# Patient Record
Sex: MALE | Race: WHITE | NOT HISPANIC OR LATINO | Employment: OTHER | URBAN - NONMETROPOLITAN AREA
[De-identification: names, ages, dates, MRNs, and addresses within clinical notes are randomized per-mention and may not be internally consistent; named-entity substitution may affect disease eponyms.]

---

## 2021-05-24 ENCOUNTER — IMMUNIZATIONS (OUTPATIENT)
Dept: FAMILY MEDICINE CLINIC | Facility: HOSPITAL | Age: 61
End: 2021-05-24

## 2021-05-24 DIAGNOSIS — Z23 ENCOUNTER FOR IMMUNIZATION: Primary | ICD-10-CM

## 2021-05-24 PROCEDURE — 91300 SARS-COV-2 / COVID-19 MRNA VACCINE (PFIZER-BIONTECH) 30 MCG: CPT

## 2021-05-24 PROCEDURE — 0001A SARS-COV-2 / COVID-19 MRNA VACCINE (PFIZER-BIONTECH) 30 MCG: CPT

## 2021-06-16 ENCOUNTER — IMMUNIZATIONS (OUTPATIENT)
Dept: FAMILY MEDICINE CLINIC | Facility: HOSPITAL | Age: 61
End: 2021-06-16

## 2021-06-16 DIAGNOSIS — Z23 ENCOUNTER FOR IMMUNIZATION: Primary | ICD-10-CM

## 2021-06-16 PROCEDURE — 0002A SARS-COV-2 / COVID-19 MRNA VACCINE (PFIZER-BIONTECH) 30 MCG: CPT

## 2021-06-16 PROCEDURE — 91300 SARS-COV-2 / COVID-19 MRNA VACCINE (PFIZER-BIONTECH) 30 MCG: CPT

## 2021-07-07 ENCOUNTER — HOSPITAL ENCOUNTER (EMERGENCY)
Facility: HOSPITAL | Age: 61
Discharge: HOME | End: 2021-07-07
Attending: STUDENT IN AN ORGANIZED HEALTH CARE EDUCATION/TRAINING PROGRAM
Payer: COMMERCIAL

## 2021-07-07 VITALS
RESPIRATION RATE: 18 BRPM | DIASTOLIC BLOOD PRESSURE: 63 MMHG | SYSTOLIC BLOOD PRESSURE: 131 MMHG | HEIGHT: 67 IN | BODY MASS INDEX: 41.59 KG/M2 | TEMPERATURE: 97.6 F | WEIGHT: 264.99 LBS | HEART RATE: 55 BPM | OXYGEN SATURATION: 98 %

## 2021-07-07 DIAGNOSIS — S89.90XA INJURY OF CALF: ICD-10-CM

## 2021-07-07 DIAGNOSIS — M79.89 SWELLING OF CALF: Primary | ICD-10-CM

## 2021-07-07 PROCEDURE — 99284 EMERGENCY DEPT VISIT MOD MDM: CPT | Performed by: PHYSICIAN ASSISTANT

## 2021-07-07 PROCEDURE — 99283 EMERGENCY DEPT VISIT LOW MDM: CPT

## 2021-07-07 PROCEDURE — 96372 THER/PROPH/DIAG INJ SC/IM: CPT

## 2021-07-07 RX ORDER — LOSARTAN POTASSIUM 50 MG/1
TABLET ORAL
COMMUNITY

## 2021-07-07 RX ORDER — FLUOXETINE HYDROCHLORIDE 40 MG/1
CAPSULE ORAL
COMMUNITY
Start: 2021-06-03

## 2021-07-07 RX ORDER — OMEPRAZOLE 20 MG/1
CAPSULE, DELAYED RELEASE ORAL
COMMUNITY

## 2021-07-07 RX ORDER — SPIRONOLACTONE 25 MG/1
25 TABLET ORAL DAILY
COMMUNITY
Start: 2021-05-29

## 2021-07-07 RX ORDER — AMLODIPINE BESYLATE 10 MG/1
TABLET ORAL
COMMUNITY
Start: 2021-06-10

## 2021-07-07 RX ORDER — PRASUGREL 10 MG/1
TABLET, FILM COATED ORAL
COMMUNITY

## 2021-07-07 RX ORDER — LEVOTHYROXINE SODIUM 0.05 MG/1
TABLET ORAL
COMMUNITY
Start: 2021-06-03

## 2021-07-07 RX ORDER — BUPROPION HYDROCHLORIDE 150 MG/1
TABLET, EXTENDED RELEASE ORAL
COMMUNITY
Start: 2021-05-19

## 2021-07-07 RX ORDER — METOPROLOL TARTRATE 50 MG/1
50 TABLET, FILM COATED ORAL EVERY 12 HOURS
COMMUNITY
Start: 2021-05-20

## 2021-07-07 RX ORDER — EMPAGLIFLOZIN 25 MG/1
TABLET, FILM COATED ORAL
COMMUNITY

## 2021-07-07 RX ORDER — IBUPROFEN 200 MG
600 TABLET ORAL
COMMUNITY

## 2021-07-07 RX ORDER — ATORVASTATIN CALCIUM 40 MG/1
TABLET, FILM COATED ORAL
COMMUNITY
Start: 2021-06-11

## 2021-07-07 RX ORDER — ASPIRIN 81 MG/1
TABLET ORAL
COMMUNITY

## 2021-07-07 RX ORDER — LOSARTAN POTASSIUM 100 MG/1
100 TABLET ORAL DAILY
COMMUNITY
Start: 2021-05-29

## 2021-07-07 RX ADMIN — ENOXAPARIN SODIUM 120 MG: 120 INJECTION SUBCUTANEOUS at 16:05

## 2021-07-07 NOTE — ED PROVIDER NOTES
History  Chief Complaint   Patient presents with    Leg Pain     pt c/o left calf pain after standing on incline and turning extremity feeling a tear and heard a snap with immediate swelling and pain for past 4 days  pt seen pcp yesterday and instructed to come to ED per further evaulation  hx dvt in opposite leg  denies travel/sob/fevers/cough     51-year-old male presents the emergency department for evaluation of left calf pain/swelling  Patient states 4 days ago he was standing on an incline states he went to turn and he is not sure if his shoes caught on the ground but he felt a tear in his left calf  Patient states he has been treating pain as needed at home  Reports pain resolves with rest but worsens with bearing weight  Denies any instability in the leg  Denies any pain in the knee  Reports calf has been swollen and tender  Reports bruising  Patient reports history of DVT in right leg after a surgery  No longer any anticoagulation  Patient was seen by PCP yesterday instructed to come to the emergency department for further evaluation  Patient denies any weakness, numbness, paresthesias in the leg  Patient denies any chest pain, palpitations, shortness of breath, hemoptysis  Denies any recent surgeries or bed rest       History provided by:  Patient  Leg Pain  Location:  Leg  Leg location:  L lower leg  Pain details:     Quality:  Unable to specify    Radiates to:  Does not radiate    Severity:  Mild    Onset quality:  Sudden    Duration:  4 days    Progression:  Unchanged  Chronicity:  New  Dislocation: no    Relieved by:  Rest  Worsened by:  Bearing weight  Associated symptoms: swelling    Associated symptoms: no back pain, no decreased ROM, no fatigue, no fever, no itching, no muscle weakness, no neck pain, no numbness, no stiffness and no tingling        Prior to Admission Medications   Prescriptions Last Dose Informant Patient Reported? Taking?    Cholecalciferol 125 MCG (5000 UT) capsule Yes No   Sig: Take by mouth   Empagliflozin (Jardiance) 25 MG TABS   Yes No   Sig: Take by mouth   FLUoxetine (PROzac) 40 MG capsule   Yes No   amLODIPine (NORVASC) 10 mg tablet   Yes No   aspirin (Aspir-Low) 81 mg EC tablet   Yes No   Sig: Take by mouth   atorvastatin (LIPITOR) 40 mg tablet   Yes No   buPROPion (ZYBAN) 150 MG 12 hr tablet   Yes No   ibuprofen (MOTRIN) 200 mg tablet   Yes No   Sig: Take 600 mg by mouth   insulin aspart (NovoLOG) 100 units/mL injection   Yes No   levothyroxine 50 mcg tablet   Yes No   losartan (COZAAR) 100 MG tablet   Yes No   Sig: Take 100 mg by mouth daily   losartan (COZAAR) 50 mg tablet   Yes No   metFORMIN (GLUCOPHAGE) 1000 MG tablet   Yes No   metoprolol tartrate (LOPRESSOR) 50 mg tablet   Yes No   Sig: Take 50 mg by mouth every 12 (twelve) hours   omeprazole (PriLOSEC) 20 mg delayed release capsule   Yes No   prasugrel (EFFIENT) tablet   Yes No   Sig: Take by mouth   spironolactone (ALDACTONE) 25 mg tablet   Yes No   Sig: Take 25 mg by mouth daily      Facility-Administered Medications: None       History reviewed  No pertinent past medical history  History reviewed  No pertinent surgical history  History reviewed  No pertinent family history  I have reviewed and agree with the history as documented  E-Cigarette/Vaping    E-Cigarette Use Never User      E-Cigarette/Vaping Substances     Social History     Tobacco Use    Smoking status: Never Smoker    Smokeless tobacco: Never Used   Vaping Use    Vaping Use: Never used   Substance Use Topics    Alcohol use: Not Currently    Drug use: Never       Review of Systems   Constitutional: Negative  Negative for fatigue and fever  HENT: Negative  Respiratory: Negative  Cardiovascular: Negative  Gastrointestinal: Negative  Musculoskeletal: Negative for back pain, neck pain and stiffness  Calf swelling left   Skin: Positive for color change  Negative for itching  Neurological: Negative      All other systems reviewed and are negative  Physical Exam  Physical Exam  Vitals and nursing note reviewed  Constitutional:       General: He is not in acute distress  Appearance: Normal appearance  He is obese  He is not ill-appearing, toxic-appearing or diaphoretic  HENT:      Head: Normocephalic and atraumatic  Nose: Nose normal       Mouth/Throat:      Mouth: Mucous membranes are moist       Pharynx: Oropharynx is clear  No oropharyngeal exudate or posterior oropharyngeal erythema  Eyes:      Extraocular Movements: Extraocular movements intact  Conjunctiva/sclera: Conjunctivae normal       Pupils: Pupils are equal, round, and reactive to light  Cardiovascular:      Rate and Rhythm: Normal rate and regular rhythm  Pulses:           Dorsalis pedis pulses are 2+ on the left side  Posterior tibial pulses are 2+ on the left side  Pulmonary:      Effort: Pulmonary effort is normal  No respiratory distress  Breath sounds: Normal breath sounds  No stridor  No wheezing, rhonchi or rales  Chest:      Chest wall: No tenderness  Musculoskeletal:         General: Swelling ( left calf) and tenderness ( left calf) present  Normal range of motion  Cervical back: Normal range of motion  Comments: Patient with swelling of the left calf  He is able to flex and extend his ankle  There is some bruising over the shin  No pallor  Patient with good pulses and normal sensation  Patient with no pain when lying   Skin:     General: Skin is warm and dry  Capillary Refill: Capillary refill takes less than 2 seconds  Coloration: Skin is not jaundiced or pale  Findings: Bruising (Left shin -yellow in nature appears to be healing) present  No erythema  Comments: No wounds   Neurological:      General: No focal deficit present  Mental Status: He is alert and oriented to person, place, and time  Cranial Nerves: No cranial nerve deficit        Sensory: No sensory deficit  Motor: No weakness  Coordination: Coordination normal       Gait: Gait normal       Deep Tendon Reflexes: Reflexes normal    Psychiatric:         Mood and Affect: Mood normal          Behavior: Behavior normal          Thought Content: Thought content normal          Judgment: Judgment normal          Vital Signs  ED Triage Vitals [07/07/21 1518]   Temperature Pulse Respirations Blood Pressure SpO2   97 6 °F (36 4 °C) 57 17 141/65 98 %      Temp Source Heart Rate Source Patient Position - Orthostatic VS BP Location FiO2 (%)   Temporal Monitor Lying Right arm --      Pain Score       6           Vitals:    07/07/21 1518 07/07/21 1530 07/07/21 1545 07/07/21 1600   BP: 141/65 119/59 137/71 131/63   Pulse: 57 56 56 55   Patient Position - Orthostatic VS: Lying  Lying Lying         Visual Acuity      ED Medications  Medications   enoxaparin (LOVENOX) subcutaneous injection 120 mg (120 mg Subcutaneous Given 7/7/21 1605)       Diagnostic Studies  Results Reviewed     None                 VAS lower limb venous duplex study, unilateral/limited    (Results Pending)              Procedures  Procedures         ED Course             MDM  Number of Diagnoses or Management Options  Injury of calf: new and requires workup  Swelling of calf: new and requires workup  Diagnosis management comments: 64year old male presents to the emergency department for evaluation of left calf swelling and pain status post twisting injury 4 days ago  Vitals and medical record reviewed  On exam calf is swollen with bruising over the shin which appears to be healing  He has no tenderness when laying and refused any analgesia in the ED  Patient is able to flex and extend the ankle  He has normal pulses and good sensation  Patient has no pallor  he denies any paresthesias  Compartment syndrome unlikely  Patient has no bony tenderness on exam   Normal range of motion of the knee  Unable to get ultrasound at this time  Patient was given dose of subcu Lovenox and will return tomorrow for duplex  We will distally follow-up with orthopedics  Strict return precautions were discussed and the patient verbalized understanding  He remained well emergency department and was discharged home       Amount and/or Complexity of Data Reviewed  Review and summarize past medical records: yes        Disposition  Final diagnoses:   Swelling of calf   Injury of calf     Time reflects when diagnosis was documented in both MDM as applicable and the Disposition within this note     Time User Action Codes Description Comment    7/7/2021  3:50 PM Wilburt Rice Add [M79 89] Swelling of calf     7/7/2021  3:52 PM Wilburt Rice Add [S89 90XA] Injury of calf       ED Disposition     ED Disposition Condition Date/Time Comment    Discharge Stable Wed Jul 7, 2021  3:50 PM Marino Lowe discharge to home/self care              Follow-up Information     Follow up With Specialties Details Why [address], DO Family Medicine   [address] [address] [phone]      Juan Shock, [address]  [facility]   [address] [facility]  [address]  [phone]            Discharge Medication List as of 7/7/2021  4:06 PM      CONTINUE these medications which have NOT CHANGED    Details   amLODIPine (NORVASC) 10 mg tablet Starting Thu 6/10/2021, Historical Med      aspirin (Aspir-Low) 81 mg EC tablet Take by mouth, Historical Med      atorvastatin (LIPITOR) 40 mg tablet Starting Fri 6/11/2021, Historical Med      buPROPion (ZYBAN) 150 MG 12 hr tablet Starting Wed 5/19/2021, Historical Med      Cholecalciferol 125 MCG (5000 UT) capsule Take by mouth, Historical Med      Empagliflozin (Jardiance) 25 MG TABS Take by mouth, Historical Med      FLUoxetine (PROzac) 40 MG capsule Starting Thu 6/3/2021, Historical Med      ibuprofen (MOTRIN) 200 mg tablet Take 600 mg by mouth, Historical Med      insulin aspart (NovoLOG) 100 units/mL injection Historical Med      levothyroxine 50 mcg tablet Starting Thu 6/3/2021, Historical Med      !! losartan (COZAAR) 100 MG tablet Take 100 mg by mouth daily, Starting Sat 5/29/2021, Historical Med      !! losartan (COZAAR) 50 mg tablet Historical Med      metFORMIN (GLUCOPHAGE) 1000 MG tablet Starting Wed 6/16/2021, Historical Med      metoprolol tartrate (LOPRESSOR) 50 mg tablet Take 50 mg by mouth every 12 (twelve) hours, Starting Thu 5/20/2021, Historical Med      omeprazole (PriLOSEC) 20 mg delayed release capsule Historical Med      prasugrel (EFFIENT) tablet Take by mouth, Historical Med      spironolactone (ALDACTONE) 25 mg tablet Take 25 mg by mouth daily, Starting Sat 5/29/2021, Historical Med       !! - Potential duplicate medications found  Please discuss with provider  Outpatient Discharge Orders   Ambulatory referral to Orthopedic Surgery   Standing Status: Future Standing Exp  Date: 07/07/22      VAS lower limb venous duplex study, unilateral/limited   Standing Status: Future Standing Exp   Date: 07/07/25       PDMP Review     None          ED Provider  Electronically Signed by           Frandy Sandoval PA-C  07/07/21 3433

## 2021-07-07 NOTE — DISCHARGE INSTRUCTIONS
You were given does a blood thinner in the emergency department today  Is important that you return tomorrow for an ultrasound of her leg to rule out a blood clot  Someone should give you a call to make this appointment  Additionally I would like you to follow up with an orthopedic doctor, referral has been placed for you  If you have any worsening symptoms do hesitate to return to the emergency department

## 2021-07-08 ENCOUNTER — HOSPITAL ENCOUNTER (OUTPATIENT)
Dept: NON INVASIVE DIAGNOSTICS | Facility: HOSPITAL | Age: 61
Discharge: HOME | End: 2021-07-08
Payer: COMMERCIAL

## 2021-07-08 DIAGNOSIS — S89.90XA INJURY OF CALF: ICD-10-CM

## 2021-07-08 DIAGNOSIS — M79.89 SWELLING OF CALF: ICD-10-CM

## 2021-07-08 PROCEDURE — 93971 EXTREMITY STUDY: CPT | Performed by: SURGERY

## 2021-07-08 PROCEDURE — 93971 EXTREMITY STUDY: CPT

## 2021-07-09 VITALS
WEIGHT: 258 LBS | TEMPERATURE: 98.2 F | BODY MASS INDEX: 40.49 KG/M2 | HEART RATE: 60 BPM | HEIGHT: 67 IN | DIASTOLIC BLOOD PRESSURE: 92 MMHG | SYSTOLIC BLOOD PRESSURE: 140 MMHG

## 2021-07-09 DIAGNOSIS — S86.812A STRAIN OF CALF MUSCLE, LEFT, INITIAL ENCOUNTER: Primary | ICD-10-CM

## 2021-07-09 DIAGNOSIS — M79.89 SWELLING OF CALF: ICD-10-CM

## 2021-07-09 PROCEDURE — 3008F BODY MASS INDEX DOCD: CPT | Performed by: ORTHOPAEDIC SURGERY

## 2021-07-09 PROCEDURE — 99203 OFFICE O/P NEW LOW 30 MIN: CPT | Performed by: ORTHOPAEDIC SURGERY

## 2021-07-09 PROCEDURE — 1036F TOBACCO NON-USER: CPT | Performed by: ORTHOPAEDIC SURGERY

## 2021-07-09 NOTE — PROGRESS NOTES
ASSESSMENT/PLAN:    Diagnoses and all orders for this visit:    Strain of calf muscle, left, initial encounter    Swelling of calf  -     Ambulatory referral to Orthopedic Surgery          The patient was informed that he likely sustained a strain of his calf  He was encouraged to continue resting and elevating the left lower extremity  He may try ice to help minimize swelling  He was encouraged to try moist heat or a heating pad if he feels   Crampy pain in his calf  He will continue the Tylenol as needed for pain control  We will see him back in 10 days  He was encouraged to contact the office should questions or concerns arise  I did discuss the potential benefits of physical therapy but he would prefer to avoid therapy if possible  Will assess his progress over the next 10 days and determine if therapy would be reasonable  Return in about 10 days (around 7/19/2021)  _____________________________________________________  CHIEF COMPLAINT:  Chief Complaint   Patient presents with    Left Lower Leg - Pain, Numbness, Swelling         SUBJECTIVE:  Leona Yan is a 64 y o  male who presents  For evaluation of left calf swelling and pain  The patient reports he was watching fireworks on July 4th where he was standing on an incline  At the end of the display, he pivoted to leave and felt a pop in the left calf  He had immediate pain and swelling  He presented to the ED on 07/07 continued pain and swelling at the urging of his PCP  Doppler  Ultrasound was performed to rule out DVT as he has history of DVT in the right lower extremity  This was negative and he was referred to Orthopedics for definitive evaluation and treatment  He denies any prior injuries or trauma in the left lower extremity  He reports pain well localized to the muscle belly of the calf that worsens with any ambulation    He does note that he did have bruising on the calf extending distally but does admit that most of it has resolved  He has been taking Tylenol for the pain without much benefit  He is unable to take NSAIDs due to history of cardiac disease  He denies numbness or tingling  He denies shortness of breath or chest pain  PAST MEDICAL HISTORY:  Past Medical History:   Diagnosis Date    Diabetes mellitus ([facility] [address] )     Heart attack ([facility] [address] ) 1999    Heart disease     Hypertension     Hypothyroidism     Osteoarthritis        PAST SURGICAL HISTORY:  Past Surgical History:   Procedure Laterality Date    CARPAL TUNNEL RELEASE Bilateral     x2    CORONARY STENT PLACEMENT      IVC FILTER INSERTION      QUADRICEPS TENDON REPAIR Right 2001    ROTATOR CUFF REPAIR Bilateral        FAMILY HISTORY:  History reviewed  No pertinent family history      SOCIAL HISTORY:  Social History     Tobacco Use    Smoking status: Never Smoker    Smokeless tobacco: Never Used   Vaping Use    Vaping Use: Never used   Substance Use Topics    Alcohol use: Yes     Comment: rare    Drug use: Never       MEDICATIONS:    Current Outpatient Medications:     amLODIPine (NORVASC) 10 mg tablet, , Disp: , Rfl:     aspirin (Aspir-Low) 81 mg EC tablet, Take by mouth, Disp: , Rfl:     atorvastatin (LIPITOR) 40 mg tablet, , Disp: , Rfl:     buPROPion (ZYBAN) 150 MG 12 hr tablet, , Disp: , Rfl:     Cholecalciferol 125 MCG (5000 UT) capsule, Take by mouth, Disp: , Rfl:     Empagliflozin (Jardiance) 25 MG TABS, Take by mouth, Disp: , Rfl:     FLUoxetine (PROzac) 40 MG capsule, , Disp: , Rfl:     ibuprofen (MOTRIN) 200 mg tablet, Take 600 mg by mouth, Disp: , Rfl:     insulin aspart (NovoLOG) 100 units/mL injection, , Disp: , Rfl:     levothyroxine 50 mcg tablet, , Disp: , Rfl:     losartan (COZAAR) 100 MG tablet, Take 100 mg by mouth daily, Disp: , Rfl:     metFORMIN (GLUCOPHAGE) 1000 MG tablet, , Disp: , Rfl:     metoprolol tartrate (LOPRESSOR) 50 mg tablet, Take 50 mg by mouth every 12 (twelve) hours, Disp: , Rfl:     omeprazole (PriLOSEC) 20 mg delayed release capsule, , Disp: , Rfl:     spironolactone (ALDACTONE) 25 mg tablet, Take 25 mg by mouth daily, Disp: , Rfl:     losartan (COZAAR) 50 mg tablet, , Disp: , Rfl:     prasugrel (EFFIENT) tablet, Take by mouth, Disp: , Rfl:     ALLERGIES:  No Known Allergies    Review of systems:   Constitutional: Negative for fatigue, fever or loss of apetite  HENT: Negative  Respiratory: Negative for shortness of breath, dyspnea  Cardiovascular: Negative for chest pain/tightness  Gastrointestinal: Negative for abdominal pain, N/V  Endocrine: Negative for cold/heat intolerance, unexplained weight loss/gain  Genitourinary: Negative for flank pain, dysuria, hematuria  Musculoskeletal:   Positive as stated in the HPI  Skin: Negative for rash  Neurological:   Negative for numbness and tingling  Psychiatric/Behavioral: Negative for agitation  _____________________________________________________  PHYSICAL EXAMINATION:    Blood pressure 140/92, pulse 60, temperature 98 2 °F (36 8 °C), height 5' 7" (1 702 m), weight 117 kg (258 lb)  General: well developed and well nourished, alert, oriented times 3 and appears comfortable  Psychiatric: Normal  HEENT: Benign  Cardiovascular: Regular    Pulmonary: No wheezing or stridor  Abdomen: Soft, Nontender  Skin: No masses, erythema, lacerations, fluctation, ulcerations  Neurovascular: Motor and sensory exams are grossly intact, distal pulses are palpable  Limb is warm and well perfused good color and capillary refill the toes  MUSCULOSKELETAL EXAMINATION:      The left lower extremity exam demonstrates skin intact, no erythema  There is resolving ecchymosis in the distal lower leg and ankle  He has tenderness to palpation over the muscle belly of the calf  No tenderness over osseous structures  He is able to actively plantar flex and dorsiflex the ankle but does complain of pain  Achilles is full and palpable   The patient has full flexion of the left knee but does complain of pain in the calf with end range extension of the knee  The remainder of the left lower extremity exam is benign          Helio Mendez

## 2021-11-30 ENCOUNTER — OFFICE VISIT (OUTPATIENT)
Dept: URGENT CARE | Facility: CLINIC | Age: 61
End: 2021-11-30
Payer: COMMERCIAL

## 2021-11-30 VITALS
BODY MASS INDEX: 39.4 KG/M2 | HEIGHT: 68 IN | WEIGHT: 260 LBS | OXYGEN SATURATION: 97 % | TEMPERATURE: 98 F | HEART RATE: 67 BPM | RESPIRATION RATE: 18 BRPM

## 2021-11-30 DIAGNOSIS — J06.9 VIRAL URI WITH COUGH: Primary | ICD-10-CM

## 2021-11-30 DIAGNOSIS — Z20.822 EXPOSURE TO CONFIRMED CASE OF COVID-19: ICD-10-CM

## 2021-11-30 PROCEDURE — 99213 OFFICE O/P EST LOW 20 MIN: CPT | Performed by: PHYSICIAN ASSISTANT

## 2021-11-30 PROCEDURE — 87635 SARS-COV-2 COVID-19 AMP PRB: CPT | Performed by: PHYSICIAN ASSISTANT

## 2021-11-30 PROCEDURE — G0463 HOSPITAL OUTPT CLINIC VISIT: HCPCS | Performed by: PHYSICIAN ASSISTANT

## 2021-11-30 PROCEDURE — U0003 INFECTIOUS AGENT DETECTION BY NUCLEIC ACID (DNA OR RNA); SEVERE ACUTE RESPIRATORY SYNDROME CORONAVIRUS 2 (SARS-COV-2) (CORONAVIRUS DISEASE [COVID-19]), AMPLIFIED PROBE TECHNIQUE, MAKING USE OF HIGH THROUGHPUT TECHNOLOGIES AS DESCRIBED BY CMS-2020-01-R: HCPCS | Performed by: PHYSICIAN ASSISTANT

## 2021-12-03 ENCOUNTER — TELEPHONE (OUTPATIENT)
Dept: URGENT CARE | Facility: CLINIC | Age: 61
End: 2021-12-03

## 2021-12-03 LAB — SARS-COV-2 RNA RESP QL NAA+PROBE: POSITIVE

## 2025-04-22 ENCOUNTER — OFFICE VISIT (OUTPATIENT)
Dept: FAMILY MEDICINE CLINIC | Facility: CLINIC | Age: 65
End: 2025-04-22
Payer: COMMERCIAL

## 2025-04-22 VITALS
HEART RATE: 48 BPM | OXYGEN SATURATION: 98 % | BODY MASS INDEX: 39.33 KG/M2 | SYSTOLIC BLOOD PRESSURE: 124 MMHG | WEIGHT: 250.6 LBS | DIASTOLIC BLOOD PRESSURE: 68 MMHG | HEIGHT: 67 IN

## 2025-04-22 DIAGNOSIS — I25.10 CORONARY ARTERY DISEASE INVOLVING NATIVE CORONARY ARTERY OF NATIVE HEART WITHOUT ANGINA PECTORIS: ICD-10-CM

## 2025-04-22 DIAGNOSIS — N52.1 ERECTILE DYSFUNCTION ASSOCIATED WITH TYPE 2 DIABETES MELLITUS  (HCC): ICD-10-CM

## 2025-04-22 DIAGNOSIS — E11.65 UNCONTROLLED TYPE 2 DIABETES MELLITUS WITH HYPERGLYCEMIA (HCC): ICD-10-CM

## 2025-04-22 DIAGNOSIS — I10 ESSENTIAL HYPERTENSION: ICD-10-CM

## 2025-04-22 DIAGNOSIS — Z12.5 SCREENING FOR PROSTATE CANCER: ICD-10-CM

## 2025-04-22 DIAGNOSIS — Z00.00 WELCOME TO MEDICARE PREVENTIVE VISIT: Primary | ICD-10-CM

## 2025-04-22 DIAGNOSIS — E11.69 ERECTILE DYSFUNCTION ASSOCIATED WITH TYPE 2 DIABETES MELLITUS  (HCC): ICD-10-CM

## 2025-04-22 PROBLEM — K21.9 GASTROESOPHAGEAL REFLUX DISEASE: Status: ACTIVE | Noted: 2017-11-21

## 2025-04-22 PROBLEM — E03.9 HYPOTHYROIDISM: Status: ACTIVE | Noted: 2017-11-21

## 2025-04-22 PROBLEM — E66.9 OBESITY: Status: ACTIVE | Noted: 2017-07-11

## 2025-04-22 PROBLEM — E78.5 HYPERLIPIDEMIA: Status: ACTIVE | Noted: 2017-07-11

## 2025-04-22 PROBLEM — E66.09 CLASS 2 OBESITY DUE TO EXCESS CALORIES WITHOUT SERIOUS COMORBIDITY WITH BODY MASS INDEX (BMI) OF 37.0 TO 37.9 IN ADULT: Status: ACTIVE | Noted: 2025-03-31

## 2025-04-22 PROBLEM — E66.812 CLASS 2 OBESITY DUE TO EXCESS CALORIES WITHOUT SERIOUS COMORBIDITY WITH BODY MASS INDEX (BMI) OF 37.0 TO 37.9 IN ADULT: Status: ACTIVE | Noted: 2025-03-31

## 2025-04-22 PROBLEM — E11.9 DIABETES MELLITUS (HCC): Status: ACTIVE | Noted: 2025-04-22

## 2025-04-22 PROBLEM — M19.90 ARTHRITIS: Status: ACTIVE | Noted: 2025-04-22

## 2025-04-22 PROBLEM — Z86.0100 HISTORY OF COLONIC POLYPS: Status: ACTIVE | Noted: 2017-07-11

## 2025-04-22 PROBLEM — E07.9 THYROID DISEASE: Status: ACTIVE | Noted: 2025-04-22

## 2025-04-22 PROBLEM — Z86.718 HISTORY OF DVT OF LOWER EXTREMITY: Status: ACTIVE | Noted: 2025-04-22

## 2025-04-22 LAB
CREAT UR-MCNC: 46.9 MG/DL
MICROALBUMIN UR-MCNC: 8.9 MG/L
MICROALBUMIN/CREAT 24H UR: 19 MG/G CREATININE (ref 0–30)
SL AMB POCT HEMOGLOBIN AIC: 7.7 (ref ?–6.5)

## 2025-04-22 PROCEDURE — 83036 HEMOGLOBIN GLYCOSYLATED A1C: CPT | Performed by: FAMILY MEDICINE

## 2025-04-22 PROCEDURE — 99173 VISUAL ACUITY SCREEN: CPT | Performed by: FAMILY MEDICINE

## 2025-04-22 PROCEDURE — 82043 UR ALBUMIN QUANTITATIVE: CPT | Performed by: FAMILY MEDICINE

## 2025-04-22 PROCEDURE — 82570 ASSAY OF URINE CREATININE: CPT | Performed by: FAMILY MEDICINE

## 2025-04-22 PROCEDURE — G0402 INITIAL PREVENTIVE EXAM: HCPCS | Performed by: FAMILY MEDICINE

## 2025-04-22 PROCEDURE — G0403 EKG FOR INITIAL PREVENT EXAM: HCPCS | Performed by: FAMILY MEDICINE

## 2025-04-22 NOTE — ASSESSMENT & PLAN NOTE
Continue meds, no changes  Orders:    Lipid panel; Future    CBC and differential; Future    Comprehensive metabolic panel; Future

## 2025-04-22 NOTE — ASSESSMENT & PLAN NOTE
Continue meds, check labs  Orders:    Lipid panel; Future    CBC and differential; Future    Comprehensive metabolic panel; Future

## 2025-04-22 NOTE — PROGRESS NOTES
Name: Pravin Parikh      : 1960      MRN: [id number]  Encounter Provider: Robert Budinetz, MD  Encounter Date: 2025   Encounter department: [facility] PRIMARY CARE  :  Assessment & Plan  Welcome to Medicare preventive visit  Reviewed age-appropriate health maintenance and preventive care.           Coronary artery disease involving native coronary artery of native heart without angina pectoris  Continue meds, check labs  Orders:    Lipid panel; Future    CBC and differential; Future    Comprehensive metabolic panel; Future    Essential hypertension  Continue meds, no changes  Orders:    Lipid panel; Future    CBC and differential; Future    Comprehensive metabolic panel; Future    Screening for prostate cancer  Check psa  Orders:    PSA, Total Screen; Future    Erectile dysfunction associated with type 2 diabetes mellitus  (HCC)    Lab Results   Component Value Date    HGBA1C 9 (H) 2021               Preventive health issues were discussed with patient, and age appropriate screening tests were ordered as noted in patient's After Visit Summary. Personalized health advice and appropriate referrals for health education or preventive services given if needed, as noted in patient's After Visit Summary.    History of Present Illness     Pravin is here for his initial visit.  Very healthy and pleasant 65-year-old man.  His diabetes is managed by endocrinology he does see [facility] cardiology.  He has no complaints or concerns I reviewed all of his medications and his medical problems.  He has no active chest pain or shortness of breath.  He is not having issues with balance or falling.  He is up-to-date on colon screening working to get blood work with a PSA.  He does not smoke and has never smoked.  He understands importance of healthy diet exercise and weight loss.       Patient Care Team:  Elham Rey DO as PCP - General (Family Medicine)    Review of Systems  Medical History Reviewed by  provider this encounter:  Tobacco  Allergies  Meds  Problems  Med Hx  Surg Hx  Fam Hx       Annual Wellness Visit Questionnaire       Health Risk Assessment:   Patient rates overall health as good. Patient feels that their physical health rating is same. Patient is satisfied with their life. Eyesight was rated as same. Hearing was rated as same. Patient feels that their emotional and mental health rating is same. Patients states they are often angry. Patient states they are often unusually tired/fatigued. Pain experienced in the last 7 days has been some. Patient's pain rating has been 4/10. Patient states that he has experienced no weight loss or gain in last 6 months.     Depression Screening:   PHQ-2 Score: 2  PHQ-9 Score: 4      Fall Risk Screening:   In the past year, patient has experienced: no history of falling in past year      Home Safety:  Patient has trouble with stairs inside or outside of their home. Patient has working smoke alarms and has working carbon monoxide detector. Home safety hazards include: none.     Nutrition:   Current diet is Diabetic.     Medications:   Patient is not currently taking any over-the-counter supplements. Patient is able to manage medications.     Activities of Daily Living (ADLs)/Instrumental Activities of Daily Living (IADLs):   Walk and transfer into and out of bed and chair?: Yes  Dress and groom yourself?: Yes    Bathe or shower yourself?: Yes    Feed yourself? Yes  Do your laundry/housekeeping?: Yes  Manage your money, pay your bills and track your expenses?: Yes  Make your own meals?: Yes    Do your own shopping?: Yes    Previous Hospitalizations:   Any hospitalizations or ED visits within the last 12 months?: No      Preventive Screenings        Abdominal Aortic Aneurysm (AAA) Screening:    Risk factors include: age between 65-76 yo        Lung Cancer Screening:     General: Screening Not Indicated    Immunizations:  - Immunizations due: Influenza, Prevnar 20  "and Zoster (Shingrix)    Screening, Brief Intervention, and Referral to Treatment (SBIRT)     Screening  Typical number of drinks in a day: 0  Typical number of drinks in a week: 2  Interpretation: Low risk drinking behavior.    Single Item Drug Screening:  How often have you used an illegal drug (including marijuana) or a prescription medication for non-medical reasons in the past year? never    Single Item Drug Screen Score: 0  Interpretation: Negative screen for possible drug use disorder    Social Drivers of Health     Financial Resource Strain: Not At Risk (3/31/2025)    Received from [facility]    Financial Insecurity     In the last 12 months did you skip medications to save money?: No     In the last 12 months was there a time when you needed to see a doctor but could not because of cost?: No   Food Insecurity: No Food Insecurity (4/22/2025)    Hunger Vital Sign     Worried About Running Out of Food in the Last Year: Never true     Ran Out of Food in the Last Year: Never true   Transportation Needs: No Transportation Needs (4/22/2025)    PRAPARE - Transportation     Lack of Transportation (Medical): No     Lack of Transportation (Non-Medical): No   Housing Stability: Low Risk  (4/22/2025)    Housing Stability Vital Sign     Unable to Pay for Housing in the Last Year: No     Number of Times Moved in the Last Year: 1     Homeless in the Last Year: No   Utilities: Not At Risk (4/22/2025)    [facility] Utilities     Threatened with loss of utilities: No     Vision Screening    Right eye Left eye Both eyes   Without correction      With correction 20/15 20/15 20/13       Objective   /68 (BP Location: Left arm, Patient Position: Sitting, Cuff Size: Large)   Pulse (!) 48   Ht 5' 7\" (1.702 m)   Wt 114 kg (250 lb 9.6 oz)   SpO2 98%   BMI 39.25 kg/m²     Physical Exam  Cardiovascular:      Pulses:           Dorsalis pedis pulses are 2+ on the right side and 2+ on the left side.        Posterior " tibial pulses are 2+ on the right side and 2+ on the left side.   Feet:      Right foot:      Skin integrity: No ulcer, skin breakdown, erythema, warmth, callus or dry skin.      Left foot:      Skin integrity: No ulcer, skin breakdown, erythema, warmth, callus or dry skin.           Diabetic Foot Exam

## 2025-04-28 LAB
LEFT EYE DIABETIC RETINOPATHY: NORMAL
RIGHT EYE DIABETIC RETINOPATHY: NORMAL
SEVERITY (EYE EXAM): NORMAL

## 2025-05-22 PROBLEM — Z00.00 WELCOME TO MEDICARE PREVENTIVE VISIT: Status: RESOLVED | Noted: 2025-04-22 | Resolved: 2025-05-22

## 2025-05-28 ENCOUNTER — TELEPHONE (OUTPATIENT)
Dept: ADMINISTRATIVE | Facility: OTHER | Age: 65
End: 2025-05-28

## 2025-05-28 NOTE — TELEPHONE ENCOUNTER
----- Message from Azalia PAN sent at 5/28/2025  9:34 AM EDT -----  Regarding: Care Gap request  05/28/25 9:34 [place], our patient attached above has had Diabetic Eye Exam completed/performed. Please assist in updating the patient chart by pulling the document from the Media Tab. The date of service is 2025. Thank you,[facility]

## 2025-06-04 NOTE — TELEPHONE ENCOUNTER
06/04/25 12:42 PM     Chart reviewed for Diabetic Eye Exam was/were submitted to the patient's insurance.     Rosa Nair   [facility]

## 2025-06-04 NOTE — TELEPHONE ENCOUNTER
Upon review of the In Basket request we were able to locate, review, and update the patient chart as requested for Diabetic Eye Exam.    Any additional questions or concerns should be emailed to the Practice Liaisons via the appropriate education email address, please do not reply via In Basket.    Thank you  Rosa Nair   [facility]

## 2025-06-17 DIAGNOSIS — I10 BENIGN ESSENTIAL HTN: Primary | ICD-10-CM

## 2025-06-17 RX ORDER — AMLODIPINE BESYLATE 10 MG/1
10 TABLET ORAL DAILY
Qty: 90 TABLET | Refills: 3 | Status: SHIPPED | OUTPATIENT
Start: 2025-06-17

## 2025-06-17 NOTE — TELEPHONE ENCOUNTER
Reason for call:   [x] Refill   [] Prior Auth  [] Other:     Office:   [x] PCP/Provider - Robert Budinetz, MD   [] Specialty/Provider -     Medication: amLODIPine (NORVASC) 10 mg tablet     Dose/Frequency: Oral, Daily     Quantity: 90    Pharmacy: [facility] - [facility] [address] [place]     Local Pharmacy   Does the patient have enough for 3 days?   [] Yes   [] No - Send as HP to POD    Mail Away Pharmacy   Does the patient have enough for 10 days?   [] Yes   [x] No - Send as HP to POD